# Patient Record
Sex: FEMALE
[De-identification: names, ages, dates, MRNs, and addresses within clinical notes are randomized per-mention and may not be internally consistent; named-entity substitution may affect disease eponyms.]

---

## 2021-03-31 PROBLEM — Z00.00 ENCOUNTER FOR PREVENTIVE HEALTH EXAMINATION: Status: ACTIVE | Noted: 2021-03-31

## 2021-04-01 ENCOUNTER — APPOINTMENT (OUTPATIENT)
Dept: ORTHOPEDIC SURGERY | Facility: CLINIC | Age: 71
End: 2021-04-01
Payer: MEDICARE

## 2021-04-01 VITALS — HEIGHT: 66 IN | WEIGHT: 170 LBS | BODY MASS INDEX: 27.32 KG/M2

## 2021-04-01 DIAGNOSIS — Z86.79 PERSONAL HISTORY OF OTHER DISEASES OF THE CIRCULATORY SYSTEM: ICD-10-CM

## 2021-04-01 DIAGNOSIS — M75.81 OTHER SHOULDER LESIONS, RIGHT SHOULDER: ICD-10-CM

## 2021-04-01 DIAGNOSIS — M25.511 PAIN IN RIGHT SHOULDER: ICD-10-CM

## 2021-04-01 DIAGNOSIS — M75.41 IMPINGEMENT SYNDROME OF RIGHT SHOULDER: ICD-10-CM

## 2021-04-01 DIAGNOSIS — Z86.39 PERSONAL HISTORY OF OTHER ENDOCRINE, NUTRITIONAL AND METABOLIC DISEASE: ICD-10-CM

## 2021-04-01 PROCEDURE — 73030 X-RAY EXAM OF SHOULDER: CPT | Mod: RT

## 2021-04-01 PROCEDURE — 20611 DRAIN/INJ JOINT/BURSA W/US: CPT | Mod: RT

## 2021-04-01 PROCEDURE — 76882 US LMTD JT/FCL EVL NVASC XTR: CPT | Mod: RT,59

## 2021-04-01 PROCEDURE — 99204 OFFICE O/P NEW MOD 45 MIN: CPT | Mod: 25

## 2021-04-01 RX ORDER — LOSARTAN POTASSIUM 100 MG/1
TABLET, FILM COATED ORAL
Refills: 0 | Status: ACTIVE | COMMUNITY

## 2021-04-01 NOTE — PROCEDURE
[de-identified] : DIAGNOSTIC ULTRASOUND RIGHT SHOULDER\par \par DIAGNOSTIC SONOGRAPHY of the Rotator Cuff Soft Tissue of the RIGHT SHOULDER was performed in Multiple Scan Planes with varying transducer frequencies.\par Imaging of the Supraspinatus Tendon reveals TENDONITIS, BURSITIS, ARTICULAR SIDE DEGENERATION  WITH OUT SIGNIFICANT / COMPLETE TEAR\par Imaging of the Biceps Tendon reveals no significant tear.\par Imaging of the Subscapularis Tendon reveals no significant tear.\par Imaging of the Infraspinatus Tendon reveals no significant tear.\par Key images were save digitally and reviewed with patient.\par \par \par \par INJECTION RIGHT SHOULDER SA SPACE\par \par Patient has demonstrated limited relief from NSAIDS, rest, exercises / PT, and after discussion of the risks and benefits, the patient has elected to proceed with an ULTRASOUND GUIDED injection into the RIGHT SUBACROMIAL  SPACE LATERAL APPROACH \par  \par Confirmed that the patient does not have history of prior adverse reactions, active, infections, or relevant allergies. There was no effusion, erythema, or warmth, and the skin was clear\par \par The skin was sterilized with alcohol. Ethyl Chloride was used as a topical anesthetic. Routine sterile technique. \par The site was injected UTILIZING ULTRASOUND GUIDANCE to confirm appropriate placement of the needle-\par with a mixture of medication and local anesthetic. The injection was completed without complication and a bandage was applied.\par  \par The patient tolerated the procedure well and was given post-injection instructions.Rec: Cold therapy, analgesics, avoid heavy activity.\par MEDICATION: 4cc of 1% xylocaine + 10mg of KENALOG\par \par

## 2021-04-01 NOTE — HISTORY OF PRESENT ILLNESS
[de-identified] : PATIENT COMPLAINS OF RIGHT SHOULDER - RHD\par PAIN BEGAN 2/1/2021\par PAIN   8 /10 \par RADIATING \par SHARP AND SHOOTING \par BETTER WITH HEAT \par WORSE WHEN REACHING BACK AND AT NIGHT \par \par 2017 LEFT SHOULDER ARTHROSCOPY \par \par

## 2021-04-01 NOTE — PHYSICAL EXAM
[de-identified] : PHYSICAL EXAM  RIGHT  SHOULDER\par \par MILD SCAPULAR PROTRACTION\par AROM 125 / 125 / 80 / 10\par TENDER: SA REGION ANTERIOR\par SPECIAL TESTING :\par LYNNE - POSITIVE \par DEISY - POSITIVE \par SPEED TEST - POSITIVE\par \par PATE - NEGATIVE \par APPREHENSION AND SUPPRESSION - NEGATIVE \par \par RC STRENGTH TESTING \par SS:  5/5\par SUB 5/5\par IS     5/5\par BICEPS  5/5\par \par SENSATION  - GROSSLY INTACT\par \par \par  [de-identified] : RIGHT SHOULDER XRAY (2 VIEWS - AP AND OUTLET) -  \par NO OBVIOUS FRACTURE ,  SEPARATION OR DISLOCATION \par NO SIGNIFICANT OSTEOARTHRITIS,\par TYPE 3B ACROMION \par CSA= 36.6\par

## 2021-04-01 NOTE — DISCUSSION/SUMMARY
[de-identified] : ULTRASOUND EVALUATION  REVEALS INFLAMMATORY CHANGES WITHOUT SIGNIFICANT TEAR \par PATIENT HAS ELECTED TO PROCEED WITH KENALOG INJECTION SHOULDER \par RISKS AND BENEFITS DISCUSSED - VERBAL CONSENT OBTAINED \par \par \par POST INJECTION INSTRUCTIONS\par \par COLD THERAPY , ANALGESICS PRN\par \par HOME STRETCHING AND EXERCISES QD\par \par START P.T.  2 WEEKS AFTER INJECTION \par \par MRI IF NO RELIEF\par

## 2022-08-03 ENCOUNTER — APPOINTMENT (OUTPATIENT)
Dept: ORTHOPEDIC SURGERY | Facility: CLINIC | Age: 72
End: 2022-08-03

## 2022-08-03 VITALS — WEIGHT: 165 LBS | HEIGHT: 66 IN | BODY MASS INDEX: 26.52 KG/M2

## 2022-08-03 DIAGNOSIS — M25.561 PAIN IN RIGHT KNEE: ICD-10-CM

## 2022-08-03 PROCEDURE — 99214 OFFICE O/P EST MOD 30 MIN: CPT

## 2022-08-03 PROCEDURE — 73562 X-RAY EXAM OF KNEE 3: CPT | Mod: RT

## 2022-08-03 RX ORDER — HYALURONATE SODIUM 30 MG/2 ML
30 SYRINGE (ML) INTRAARTICULAR
Qty: 3 | Refills: 0 | Status: ACTIVE | OUTPATIENT
Start: 2022-08-03

## 2022-08-03 NOTE — HISTORY OF PRESENT ILLNESS
[de-identified] : Location: Right knee\par Duration: 10 years - worse in the last 3 months\par Context: atraumatic\par Quality: achy, dull, throbbing\par Aggravating factors: walking, sleep\par Associated symptoms: swelling, clicking\par Conservative treatment: Nabumetone, elevation\par Prior studies: N/A

## 2022-08-03 NOTE — ASSESSMENT
[FreeTextEntry1] : Discussed at length the patient exam history and imaging as well as severity of arthritis.  Treatment options reviewed and patient offered physical therapy and cortisone injection but at this time elects home exercises only as well as ordering viscose supplementation.  Patient aware ultimately if no improvement she may require total knee replacement

## 2022-08-03 NOTE — PHYSICAL EXAM
[de-identified] : Right knee\par \par Constitutional: \par The patient is healthy-appearing and in no apparent distress. \par \par Gait:\par The patient ambulates with a normal gait and no limp.\par \par Cardiovascular System: \par The capillary refill is less than 2 seconds. \par \par Skin: \par There are no skin abnormalities.\par \par Right Knee:\par  \par Bony Palpation: \par There is no tenderness of the medial joint line. \par There is tenderness of the lateral joint line.\par There is tenderness of the medial femoral chondyle.\par There is tenderness of the lateral femoral chondyle.\par There is no tenderness of the tibial tubercle.\par There is no tenderness of the superior patella.\par There is no tenderness of the inferior patella.\par There is tenderness of the medial patellar facet.\par There is tenderness of the lateral patellar facet.\par \par Soft Tissue Palpation: \par There is no tenderness of the MCL.\par There is no tenderness of the medial retinaculum.\par There is no tenderness of the lateral retinaculum.\par There is no tenderness of the LCL.\par There is no tenderness of the quadriceps tendon.\par There is no tenderness of the patella tendon.\par There is no tenderness of the ITB.\par There is no tenderness of the pes anserine.\par \par Active Range of Motion: \par The range of motion at the knee actively and passively is 2 - 135 \par \par Special Tests: \par There is a negative Apley.\par There is a negative Steinmanns. \par There is a negative Lachman and Anterior Drawer.\par There is a negative Posterior Drawer.  \par There is no varus or valgus laxity.\par \par Strength: \par There is 5/5 hip flexion and 5/5 knee flexion and extension.  \par \par Psychiatric: \par The patient demonstrates a normal mood and affect and is active and alert\par  [de-identified] : Given patient's reported history and physical examination, x-ray evaluation ( as listed below ) was ordered and performed to aid in diagnosis and treatment of the patient.\par X-ray right knee.  There is moderate to severe lateral compartment and moderate patellofemoral arthritis\par \par \par

## 2022-08-04 PROBLEM — M25.561 RIGHT KNEE PAIN: Status: ACTIVE | Noted: 2022-08-04

## 2022-09-08 ENCOUNTER — APPOINTMENT (OUTPATIENT)
Dept: ORTHOPEDIC SURGERY | Facility: CLINIC | Age: 72
End: 2022-09-08

## 2022-09-08 PROCEDURE — 20611 DRAIN/INJ JOINT/BURSA W/US: CPT | Mod: RT

## 2022-09-08 RX ORDER — HYALURONATE SODIUM 30 MG/2 ML
30 SYRINGE (ML) INTRAARTICULAR
Refills: 0 | Status: COMPLETED | OUTPATIENT
Start: 2022-09-08

## 2022-09-08 RX ADMIN — Medication 0 MG/2ML: at 00:00

## 2022-09-08 NOTE — ASSESSMENT
[FreeTextEntry1] : Discussed at length with patient treatment options and is on patient elects viscous supplementation to her right knee

## 2022-09-08 NOTE — PROCEDURE
[de-identified] : After discussion of the risks and benefits, the patient elects Orthovisc injection to the knee.  Confirmed that the patient does not have history of prior adverse reactions, active infections, or relevant allergies.  There was no effusion, erythema, or warmth, and the skin was clear.\par The skin was prepped in the usual sterile manner, ethyl chloride spray was used as a topical anesthetic.  A needle was inserted \par UTILIZING ULTRASOUND GUIDANCE TO LOCALIZE THE NEEDLE IN THE KNEE JOINT\par into the RIGHT KNEE via an anterolateral approach.  Viscosupplement was then slowly injected, The injection was completed without complication and a bandage was applied.\par The patient tolerated the procedure well and was given post-injection instructions: no exercise x 48 hours, cold packs and analgesics prn

## 2022-09-08 NOTE — HISTORY OF PRESENT ILLNESS
[de-identified] : Patient is an established patient coming in for further irrigation and regard she her right knee arthritis.  She presented for further treatment discussion

## 2022-09-08 NOTE — PHYSICAL EXAM
[de-identified] : Right knee\par \par Constitutional: \par The patient is healthy-appearing and in no apparent distress. \par \par Gait:\par The patient ambulates with a normal gait and no limp.\par \par Cardiovascular System: \par The capillary refill is less than 2 seconds. \par \par Skin: \par There are no skin abnormalities.\par \par Right Knee:\par  \par Bony Palpation: \par There is no tenderness of the medial joint line. \par There is tenderness of the lateral joint line.\par There is tenderness of the medial femoral chondyle.\par There is tenderness of the lateral femoral chondyle.\par There is no tenderness of the tibial tubercle.\par There is no tenderness of the superior patella.\par There is no tenderness of the inferior patella.\par There is tenderness of the medial patellar facet.\par There is tenderness of the lateral patellar facet.\par \par Soft Tissue Palpation: \par There is no tenderness of the MCL.\par There is no tenderness of the medial retinaculum.\par There is no tenderness of the lateral retinaculum.\par There is no tenderness of the LCL.\par There is no tenderness of the quadriceps tendon.\par There is no tenderness of the patella tendon.\par There is no tenderness of the ITB.\par There is no tenderness of the pes anserine.\par \par Active Range of Motion: \par The range of motion at the knee actively and passively is 2 - 135 \par \par Special Tests: \par There is a negative Apley.\par There is a negative Steinmanns. \par There is a negative Lachman and Anterior Drawer.\par There is a negative Posterior Drawer.  \par There is no varus or valgus laxity.\par \par Strength: \par There is 5/5 hip flexion and 5/5 knee flexion and extension.  \par \par Psychiatric: \par The patient demonstrates a normal mood and affect and is active and alert\par

## 2022-09-15 ENCOUNTER — APPOINTMENT (OUTPATIENT)
Dept: ORTHOPEDIC SURGERY | Facility: CLINIC | Age: 72
End: 2022-09-15

## 2022-09-15 PROCEDURE — 20611 DRAIN/INJ JOINT/BURSA W/US: CPT | Mod: RT

## 2022-09-15 NOTE — PROCEDURE
[de-identified] : After discussion of the risks and benefits, the patient elects Orthovisc injection to the knee.  Confirmed that the patient does not have history of prior adverse reactions, active infections, or relevant allergies.  There was no effusion, erythema, or warmth, and the skin was clear.\par The skin was prepped in the usual sterile manner, ethyl chloride spray was used as a topical anesthetic.  A needle was inserted \par UTILIZING ULTRASOUND GUIDANCE TO LOCALIZE THE NEEDLE IN THE KNEE JOINT\par into the RIGHT KNEE via an anterolateral approach.  Viscosupplement was then slowly injected, The injection was completed without complication and a bandage was applied.\par The patient tolerated the procedure well and was given post-injection instructions: no exercise x 48 hours, cold packs and analgesics prn

## 2022-09-15 NOTE — HISTORY OF PRESENT ILLNESS
[de-identified] : Patient is an established patient coming in for further irrigation and regard she her right knee arthritis.  She presented for further treatment discussion

## 2022-09-15 NOTE — PHYSICAL EXAM
[de-identified] : Right knee\par \par Constitutional: \par The patient is healthy-appearing and in no apparent distress. \par \par Gait:\par The patient ambulates with a normal gait and no limp.\par \par Cardiovascular System: \par The capillary refill is less than 2 seconds. \par \par Skin: \par There are no skin abnormalities.\par \par Right Knee:\par  \par Bony Palpation: \par There is no tenderness of the medial joint line. \par There is tenderness of the lateral joint line.\par There is tenderness of the medial femoral chondyle.\par There is tenderness of the lateral femoral chondyle.\par There is no tenderness of the tibial tubercle.\par There is no tenderness of the superior patella.\par There is no tenderness of the inferior patella.\par There is tenderness of the medial patellar facet.\par There is tenderness of the lateral patellar facet.\par \par Soft Tissue Palpation: \par There is no tenderness of the MCL.\par There is no tenderness of the medial retinaculum.\par There is no tenderness of the lateral retinaculum.\par There is no tenderness of the LCL.\par There is no tenderness of the quadriceps tendon.\par There is no tenderness of the patella tendon.\par There is no tenderness of the ITB.\par There is no tenderness of the pes anserine.\par \par Active Range of Motion: \par The range of motion at the knee actively and passively is 2 - 135 \par \par Special Tests: \par There is a negative Apley.\par There is a negative Steinmanns. \par There is a negative Lachman and Anterior Drawer.\par There is a negative Posterior Drawer.  \par There is no varus or valgus laxity.\par \par Strength: \par There is 5/5 hip flexion and 5/5 knee flexion and extension.  \par \par Psychiatric: \par The patient demonstrates a normal mood and affect and is active and alert\par

## 2022-09-22 ENCOUNTER — APPOINTMENT (OUTPATIENT)
Dept: ORTHOPEDIC SURGERY | Facility: CLINIC | Age: 72
End: 2022-09-22

## 2022-09-22 DIAGNOSIS — M17.11 UNILATERAL PRIMARY OSTEOARTHRITIS, RIGHT KNEE: ICD-10-CM

## 2022-09-22 PROCEDURE — 20611 DRAIN/INJ JOINT/BURSA W/US: CPT | Mod: RT

## 2022-09-22 NOTE — PROCEDURE
[de-identified] : After discussion of the risks and benefits, the patient elects Orthovisc injection to the knee.  Confirmed that the patient does not have history of prior adverse reactions, active infections, or relevant allergies.  There was no effusion, erythema, or warmth, and the skin was clear.\par The skin was prepped in the usual sterile manner, ethyl chloride spray was used as a topical anesthetic.  A needle was inserted \par UTILIZING ULTRASOUND GUIDANCE TO LOCALIZE THE NEEDLE IN THE KNEE JOINT\par into the RIGHT KNEE via an anterolateral approach.  Viscosupplement was then slowly injected, The injection was completed without complication and a bandage was applied.\par The patient tolerated the procedure well and was given post-injection instructions: no exercise x 48 hours, cold packs and analgesics prn

## 2022-09-22 NOTE — HISTORY OF PRESENT ILLNESS
[de-identified] : Patient is an established patient coming in for further irrigation and regard she her right knee arthritis.  She presented for further treatment discussion

## 2022-09-22 NOTE — PHYSICAL EXAM
[de-identified] : Right knee\par \par Constitutional: \par The patient is healthy-appearing and in no apparent distress. \par \par Gait:\par The patient ambulates with a normal gait and no limp.\par \par Cardiovascular System: \par The capillary refill is less than 2 seconds. \par \par Skin: \par There are no skin abnormalities.\par \par Right Knee:\par  \par Bony Palpation: \par There is no tenderness of the medial joint line. \par There is tenderness of the lateral joint line.\par There is tenderness of the medial femoral chondyle.\par There is tenderness of the lateral femoral chondyle.\par There is no tenderness of the tibial tubercle.\par There is no tenderness of the superior patella.\par There is no tenderness of the inferior patella.\par There is tenderness of the medial patellar facet.\par There is tenderness of the lateral patellar facet.\par \par Soft Tissue Palpation: \par There is no tenderness of the MCL.\par There is no tenderness of the medial retinaculum.\par There is no tenderness of the lateral retinaculum.\par There is no tenderness of the LCL.\par There is no tenderness of the quadriceps tendon.\par There is no tenderness of the patella tendon.\par There is no tenderness of the ITB.\par There is no tenderness of the pes anserine.\par \par Active Range of Motion: \par The range of motion at the knee actively and passively is 2 - 135 \par \par Special Tests: \par There is a negative Apley.\par There is a negative Steinmanns. \par There is a negative Lachman and Anterior Drawer.\par There is a negative Posterior Drawer.  \par There is no varus or valgus laxity.\par \par Strength: \par There is 5/5 hip flexion and 5/5 knee flexion and extension.  \par \par Psychiatric: \par The patient demonstrates a normal mood and affect and is active and alert\par

## 2025-01-06 ENCOUNTER — APPOINTMENT (OUTPATIENT)
Dept: ORTHOPEDIC SURGERY | Facility: CLINIC | Age: 75
End: 2025-01-06
Payer: MEDICARE

## 2025-01-06 DIAGNOSIS — M17.11 UNILATERAL PRIMARY OSTEOARTHRITIS, RIGHT KNEE: ICD-10-CM

## 2025-01-06 PROCEDURE — 73562 X-RAY EXAM OF KNEE 3: CPT | Mod: RT

## 2025-01-06 PROCEDURE — 99213 OFFICE O/P EST LOW 20 MIN: CPT | Mod: 25

## 2025-01-06 PROCEDURE — 20610 DRAIN/INJ JOINT/BURSA W/O US: CPT | Mod: RT
